# Patient Record
Sex: MALE | Race: WHITE | Employment: UNEMPLOYED | URBAN - METROPOLITAN AREA
[De-identification: names, ages, dates, MRNs, and addresses within clinical notes are randomized per-mention and may not be internally consistent; named-entity substitution may affect disease eponyms.]

---
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
## 2021-11-11 ENCOUNTER — HOSPITAL ENCOUNTER (EMERGENCY)
Age: 21
Discharge: HOME | End: 2021-11-11
Attending: EMERGENCY MEDICINE
Payer: COMMERCIAL

## 2021-11-11 ENCOUNTER — APPOINTMENT (OUTPATIENT)
Dept: INTERVENTIONAL RADIOLOGY/VASCULAR | Age: 21
End: 2021-11-11
Payer: COMMERCIAL

## 2021-11-11 VITALS
TEMPERATURE: 98 F | OXYGEN SATURATION: 99 % | SYSTOLIC BLOOD PRESSURE: 128 MMHG | RESPIRATION RATE: 16 BRPM | DIASTOLIC BLOOD PRESSURE: 58 MMHG | HEART RATE: 65 BPM

## 2021-11-11 DIAGNOSIS — S30.1XXA GROIN HEMATOMA, INITIAL ENCOUNTER: Primary | ICD-10-CM

## 2021-11-11 PROCEDURE — 99281 EMR DPT VST MAYX REQ PHY/QHP: CPT

## 2021-11-11 PROCEDURE — 93926 LOWER EXTREMITY STUDY: CPT

## 2021-11-11 RX ORDER — LISINOPRIL 40 MG/1
40 TABLET ORAL DAILY
COMMUNITY

## 2021-11-11 RX ORDER — METOPROLOL SUCCINATE 25 MG/1
25 TABLET, EXTENDED RELEASE ORAL DAILY
COMMUNITY

## 2021-11-11 RX ORDER — ASPIRIN 81 MG/1
81 TABLET ORAL DAILY
COMMUNITY

## 2021-11-11 ASSESSMENT — PAIN DESCRIPTION - ORIENTATION: ORIENTATION: LEFT

## 2021-11-11 ASSESSMENT — PAIN DESCRIPTION - PAIN TYPE: TYPE: ACUTE PAIN

## 2021-11-11 ASSESSMENT — PAIN SCALES - GENERAL: PAINLEVEL_OUTOF10: 3

## 2021-11-11 ASSESSMENT — PAIN DESCRIPTION - LOCATION: LOCATION: GROIN

## 2021-11-11 NOTE — ED PROVIDER NOTES
catheterization. CURRENT MEDICATIONS       Discharge Medication List as of 11/11/2021  4:03 PM      CONTINUE these medications which have NOT CHANGED    Details   lisinopril (PRINIVIL;ZESTRIL) 40 MG tablet Take 40 mg by mouth dailyHistorical Med      metoprolol succinate (TOPROL XL) 25 MG extended release tablet Take 25 mg by mouth dailyHistorical Med      aspirin 81 MG EC tablet Take 81 mg by mouth dailyHistorical Med             ALLERGIES     has No Known Allergies. FAMILY HISTORY     has no family status information on file. family history is not on file. SOCIAL HISTORY      reports that he has never smoked. He does not have any smokeless tobacco history on file. PHYSICAL EXAM     INITIAL VITALS:  oral temperature is 98 °F (36.7 °C). His blood pressure is 128/58 (abnormal) and his pulse is 65. His respiration is 16 and oxygen saturation is 99%. Physical Exam   Constitutional:  well-developed and well-nourished. HENT: Head: Normocephalic, atraumatic, Bilateral external ears normal, Oropharynx mosit, No oral exudates, Nose normal.   Eyes: PERRL, EOMI, Conjunctiva normal, No discharge. No scleral icterus  Neck: Normal range of motion, No tenderness, Supple  Cardiovascular: Normal rate, regular rhythm, S1 normal and S2 normal.  Exam reveals no gallop. Pulmonary/Chest: Effort normal and breath sounds normal. No accessory muscle usage or stridor. No respiratory distress. no wheezes. has no rales. exhibits no tenderness. Abdominal: Soft. Bowel sounds are normal.  exhibits no distension. There is no tenderness. There is no rebound and no guarding. Extremities: No edema, no tenderness, no cyanosis, no clubbing. Musculoskeletal: Good range of motion in major joints is observed. No major deformities noted. Neurological: Alert and oriented ×3, normal motor function, normal sensory function, no focal deficits. GCS 15. Skin: Skin is warm, dry and intact. No rash noted. No erythema. Psychiatric: Affect normal, judgment normal, mood normal.  DIFFERENTIAL DIAGNOSIS:       DIAGNOSTIC RESULTS     EKG: All EKG's are interpreted by the Emergency Department Physician who either signs or Co-signs this chart in the absence of a cardiologist.      RADIOLOGY: non-plain film images(s) such as CT, Ultrasound and MRI are read by the radiologist.  Plain radiographic images are visualized and preliminarily interpreted by the emergency physician unless otherwise stated below. LABS:   Labs Reviewed - No data to display    EMERGENCY DEPARTMENT COURSE:   Vitals:    Vitals:    11/11/21 1211   BP: (!) 128/58   Pulse: 65   Resp: 16   Temp: 98 °F (36.7 °C)   TempSrc: Oral   SpO2: 99%   Presenting with complaint of hematoma formation/bruising to left groin status post heart cath with left groin used as a entry point. CRITICAL CARE:       CONSULTS:  None    PROCEDURES:  None    FINAL IMPRESSION      1.  Groin hematoma, initial encounter          DISPOSITION/PLAN   Decision To Discharge    PATIENT REFERRED TO:  Heart cath physician in [facility]    Call in 1 day  [address] [facility] [address]:  Discharge Medication List as of 11/11/2021  4:03 PM          (Please note that portions of this note were completed with a voice recognition program.  Efforts were made to edit the dictations but occasionally words are mis-transcribed.)    Lin Berg, DO Lin Berg DO  11/11/21 [id number]       Lin Berg DO  11/15/21 0784

## 2021-11-11 NOTE — ED TRIAGE NOTES
Pt presents to the ED via lobby with c/o left groin hematoma. Pt states he had a cardiac catheterization on 11/5/21 for a valve repair. Pt states he had mild pain and bruising following the procedure.  While at work today pt states the pain worsened

## 2023-01-05 NOTE — TELEPHONE ENCOUNTER
Left message on answering machine requesting pt to call back at earliest convenience. Check to see if patient is going to continue to see Dr Mitch Vargas and if so will need to schedule appt. If appt scheduled, can send message to Dr Clarita Zavala to see if he will fill until appt.

## 2023-02-16 RX ORDER — MONTELUKAST SODIUM 10 MG/1
TABLET ORAL
Qty: 30 TABLET | OUTPATIENT
Start: 2023-02-16

## 2024-10-22 ENCOUNTER — OFFICE VISIT (OUTPATIENT)
Dept: FAMILY MEDICINE CLINIC | Age: 24
End: 2024-10-22
Payer: COMMERCIAL

## 2024-10-22 VITALS
OXYGEN SATURATION: 99 % | DIASTOLIC BLOOD PRESSURE: 78 MMHG | TEMPERATURE: 98.4 F | WEIGHT: 146.8 LBS | HEART RATE: 69 BPM | RESPIRATION RATE: 16 BRPM | SYSTOLIC BLOOD PRESSURE: 124 MMHG

## 2024-10-22 DIAGNOSIS — Q25.21 INTERRUPTED AORTIC ARCH: ICD-10-CM

## 2024-10-22 DIAGNOSIS — F41.9 ANXIETY: ICD-10-CM

## 2024-10-22 DIAGNOSIS — Q25.6 PULMONARY ARTERY STENOSIS: ICD-10-CM

## 2024-10-22 DIAGNOSIS — Z98.890 S/P INTERRUPTED AORTIC ARCH REPAIR: ICD-10-CM

## 2024-10-22 DIAGNOSIS — I10 ESSENTIAL HYPERTENSION: Primary | ICD-10-CM

## 2024-10-22 DIAGNOSIS — Z87.74 H/O NORWOOD PROCEDURE: ICD-10-CM

## 2024-10-22 DIAGNOSIS — I45.10 RBBB: ICD-10-CM

## 2024-10-22 DIAGNOSIS — Q25.42 HYPOPLASTIC AORTIC ARCH: ICD-10-CM

## 2024-10-22 DIAGNOSIS — I37.0 NONRHEUMATIC PULMONARY VALVE STENOSIS: ICD-10-CM

## 2024-10-22 PROCEDURE — 3078F DIAST BP <80 MM HG: CPT | Performed by: STUDENT IN AN ORGANIZED HEALTH CARE EDUCATION/TRAINING PROGRAM

## 2024-10-22 PROCEDURE — 3074F SYST BP LT 130 MM HG: CPT | Performed by: STUDENT IN AN ORGANIZED HEALTH CARE EDUCATION/TRAINING PROGRAM

## 2024-10-22 PROCEDURE — 99214 OFFICE O/P EST MOD 30 MIN: CPT | Performed by: STUDENT IN AN ORGANIZED HEALTH CARE EDUCATION/TRAINING PROGRAM

## 2024-10-22 RX ORDER — BUSPIRONE HYDROCHLORIDE 5 MG/1
5 TABLET ORAL 3 TIMES DAILY
Qty: 90 TABLET | Refills: 0 | Status: SHIPPED | OUTPATIENT
Start: 2024-10-22 | End: 2024-11-21

## 2024-10-22 SDOH — ECONOMIC STABILITY: FOOD INSECURITY: WITHIN THE PAST 12 MONTHS, YOU WORRIED THAT YOUR FOOD WOULD RUN OUT BEFORE YOU GOT MONEY TO BUY MORE.: NEVER TRUE

## 2024-10-22 SDOH — ECONOMIC STABILITY: FOOD INSECURITY: WITHIN THE PAST 12 MONTHS, THE FOOD YOU BOUGHT JUST DIDN'T LAST AND YOU DIDN'T HAVE MONEY TO GET MORE.: NEVER TRUE

## 2024-10-22 SDOH — ECONOMIC STABILITY: INCOME INSECURITY: HOW HARD IS IT FOR YOU TO PAY FOR THE VERY BASICS LIKE FOOD, HOUSING, MEDICAL CARE, AND HEATING?: NOT HARD AT ALL

## 2024-10-22 ASSESSMENT — ENCOUNTER SYMPTOMS
ABDOMINAL PAIN: 0
SHORTNESS OF BREATH: 0
VOMITING: 0
CONSTIPATION: 0
COUGH: 0
RHINORRHEA: 0
NAUSEA: 0
EYE PAIN: 0
DIARRHEA: 0
EYE REDNESS: 0

## 2024-10-22 ASSESSMENT — PATIENT HEALTH QUESTIONNAIRE - PHQ9
SUM OF ALL RESPONSES TO PHQ QUESTIONS 1-9: 0
SUM OF ALL RESPONSES TO PHQ QUESTIONS 1-9: 0
2. FEELING DOWN, DEPRESSED OR HOPELESS: NOT AT ALL
SUM OF ALL RESPONSES TO PHQ9 QUESTIONS 1 & 2: 0
1. LITTLE INTEREST OR PLEASURE IN DOING THINGS: NOT AT ALL
SUM OF ALL RESPONSES TO PHQ QUESTIONS 1-9: 0
SUM OF ALL RESPONSES TO PHQ QUESTIONS 1-9: 0

## 2024-10-22 ASSESSMENT — ANXIETY QUESTIONNAIRES
GAD7 TOTAL SCORE: 3
IF YOU CHECKED OFF ANY PROBLEMS ON THIS QUESTIONNAIRE, HOW DIFFICULT HAVE THESE PROBLEMS MADE IT FOR YOU TO DO YOUR WORK, TAKE CARE OF THINGS AT HOME, OR GET ALONG WITH OTHER PEOPLE: SOMEWHAT DIFFICULT
1. FEELING NERVOUS, ANXIOUS, OR ON EDGE: SEVERAL DAYS
2. NOT BEING ABLE TO STOP OR CONTROL WORRYING: SEVERAL DAYS
5. BEING SO RESTLESS THAT IT IS HARD TO SIT STILL: NOT AT ALL
7. FEELING AFRAID AS IF SOMETHING AWFUL MIGHT HAPPEN: NOT AT ALL
4. TROUBLE RELAXING: NOT AT ALL
3. WORRYING TOO MUCH ABOUT DIFFERENT THINGS: SEVERAL DAYS
6. BECOMING EASILY ANNOYED OR IRRITABLE: NOT AT ALL

## 2024-10-22 NOTE — PROGRESS NOTES
Sunny Lynn (:  2000) is a 24 y.o. male,New patient, here for evaluation of the following chief complaint(s):  New Patient (New patient), Anxiety (Anxiety x would like to see about doing something as needed medication wise /), Health Maintenance (See note /), and Heart Problem (Has congential heart disease and has been struggling /)      Assessment & Plan   ASSESSMENT/PLAN:  1. Essential hypertension  2. Hypoplastic aortic arch  3. Interrupted aortic arch  4. Pulmonary artery stenosis  5. Nonrheumatic pulmonary valve stenosis  6. RBBB  7. S/P interrupted aortic arch repair  8. H/O [place] procedure  9. Anxiety  -     busPIRone (BUSPAR) 5 MG tablet; Take 1 tablet by mouth 3 times daily, Disp-90 tablet, R-0Normal  24 old male presents the office to establish as a new patient.  Patient is a pertinent past medical history of hypertension, hypoplastic aortic arch, interrupted aortic arch, pulmonary artery stenosis, nonrheumatic pulmonary valve stenosis, right bundle branch block, status post interrupted aortic arch repair, history of [place] procedure since infancy.  Patient overall stable at this time she continue to [facility].  Seems to be stable with vital stable on exam.    Anxiety: Chronic, uncontrolled.  Will plan to initiate BuSpar 5 mg 3 times daily as needed and follow-up for potential medication adjustment.  Patient is agreeable    Recent lab work reviewed    Return in about 3 months (around 2025) for medication recheck.         Subjective   SUBJECTIVE/OBJECTIVE:  24-year-old male presents the office to establish as new patient.  Has a pertinent past medical history of multiple cardiovascular conditions from congenital heart defects and multiple surgeries.  Also has new concern of anxiety    Multiple chronic medical conditions reviewed today: As stated above    Patient states he has been dealing with congenital heart defects with multiple surgeries

## 2024-11-07 ENCOUNTER — OFFICE VISIT (OUTPATIENT)
Dept: FAMILY MEDICINE CLINIC | Age: 24
End: 2024-11-07
Payer: COMMERCIAL

## 2024-11-07 VITALS
OXYGEN SATURATION: 98 % | DIASTOLIC BLOOD PRESSURE: 62 MMHG | TEMPERATURE: 98.1 F | RESPIRATION RATE: 16 BRPM | WEIGHT: 150.6 LBS | SYSTOLIC BLOOD PRESSURE: 129 MMHG | HEART RATE: 54 BPM

## 2024-11-07 DIAGNOSIS — R07.89 OTHER CHEST PAIN: Primary | ICD-10-CM

## 2024-11-07 PROCEDURE — 3074F SYST BP LT 130 MM HG: CPT | Performed by: STUDENT IN AN ORGANIZED HEALTH CARE EDUCATION/TRAINING PROGRAM

## 2024-11-07 PROCEDURE — 3078F DIAST BP <80 MM HG: CPT | Performed by: STUDENT IN AN ORGANIZED HEALTH CARE EDUCATION/TRAINING PROGRAM

## 2024-11-07 PROCEDURE — 99214 OFFICE O/P EST MOD 30 MIN: CPT | Performed by: STUDENT IN AN ORGANIZED HEALTH CARE EDUCATION/TRAINING PROGRAM

## 2024-11-07 RX ORDER — CYCLOBENZAPRINE HCL 5 MG
5 TABLET ORAL 3 TIMES DAILY PRN
Qty: 30 TABLET | Refills: 0 | Status: SHIPPED | OUTPATIENT
Start: 2024-11-07 | End: 2024-11-17

## 2024-11-07 RX ORDER — IBUPROFEN 600 MG/1
600 TABLET, FILM COATED ORAL 3 TIMES DAILY PRN
Qty: 30 TABLET | Refills: 0 | Status: SHIPPED | OUTPATIENT
Start: 2024-11-07 | End: 2024-11-17

## 2024-11-07 ASSESSMENT — ENCOUNTER SYMPTOMS
DIARRHEA: 0
CONSTIPATION: 0
COUGH: 0
ABDOMINAL PAIN: 0
VOMITING: 0
SHORTNESS OF BREATH: 0
NAUSEA: 0

## 2024-11-07 NOTE — PROGRESS NOTES
Health Maintenance Due   Topic Date Due    DTaP/Tdap/Td vaccine (6 - Tdap) 04/20/2012    HPV vaccine (1 - Male 3-dose series) Never done    HIV screen  Never done    Hepatitis C screen  Never done    Flu vaccine (1) Never done    COVID-19 Vaccine (1 - 2023-24 season) Never done

## 2024-11-07 NOTE — PROGRESS NOTES
Sunny Lynn (:  2000) is a 24 y.o. male,Established patient, here for evaluation of the following chief complaint(s):  Chest Pain (Chest discomfort - ongoing ) and Health Maintenance (See note /)      Assessment & Plan   ASSESSMENT/PLAN:  1. Other chest pain  -     cyclobenzaprine (FLEXERIL) 5 MG tablet; Take 1 tablet by mouth 3 times daily as needed for Muscle spasms, Disp-30 tablet, R-0Normal  -     ibuprofen (ADVIL;MOTRIN) 600 MG tablet; Take 1 tablet by mouth 3 times daily as needed for Pain, Disp-30 tablet, R-0Normal  24-year-old male presents the office for concerns of chest pain.  Etiology unclear at this time.  Patient did just have a cardiac evaluation that was negative including blood work which was negative, echo, EKG within normal limits.  Patient also trialed PPI therapy which has not improved symptoms.  Concern for possible underlying pleuritic chest pain versus musculoskeletal versus other.  Will plan treat with anti-inflammatories, muscle relaxer and follow-up if symptoms not improved.  Patient verbalized understanding.    Return if symptoms worsen or fail to improve.         Subjective   SUBJECTIVE/OBJECTIVE:  24-year-old male presents the office for concerns of chest pain.  Patient has a pretty extensive past medical history of multiple cardiovascular/congenital heart defects status post cardiac surgeries.  Currently follows up with cardiology just recently had a echocardiogram 2 weeks ago with chest x-ray and EKG which was all benign.  Patient also had lab work that was reviewed and benign.  Patient has been on PPI therapy for the past week or 2 which has not helped with chest pain.  Patient continues have this daily chronic intermittent chest pain that seems to radiate across both sides of his chest sometimes into his back denies any shortness of breath.  Symptoms have some discomfort when he leans forwards.  Is not really affecting his everyday function but does feel it constantly

## 2024-11-19 DIAGNOSIS — F41.9 ANXIETY: ICD-10-CM

## 2024-11-19 RX ORDER — BUSPIRONE HYDROCHLORIDE 5 MG/1
5 TABLET ORAL 3 TIMES DAILY
Qty: 90 TABLET | Refills: 0 | Status: SHIPPED | OUTPATIENT
Start: 2024-11-19

## 2024-12-18 LAB
B-TYPE NATRIURETIC PEPTIDE: 23 PG/ML (ref 0–100)
BASOPHILS ABSOLUTE: 0.08 10*3/UL (ref 0–0.2)
BASOPHILS RELATIVE PERCENT: 1.3 % (ref 0–1)
CK MB: 3.4 NG/ML (ref 0–5)
CREATININE KINASE MB/CREATININE KINASE TOTAL: 5.1 (ref 0–1.9)
EOSINOPHILS ABSOLUTE: 1.04 10*3/UL (ref 0–0.5)
EOSINOPHILS RELATIVE PERCENT: 16.3 % (ref 0–6)
HCT VFR BLD CALC: 42.5 % (ref 39–55)
HEMOGLOBIN: 14.4 G/DL (ref 13–17)
IMMATURE GRANULOCYTES %: 0.3 % (ref 0–1)
IMMATURE GRANULOCYTES ABSOLUTE: 0.02 10*3/UL (ref 0–0.2)
LYMPHOCYTES ABSOLUTE: 1.43 10*3/UL (ref 1.2–4)
LYMPHOCYTES RELATIVE PERCENT: 22.3 % (ref 20–45)
MCH RBC QN AUTO: 29.4 PG (ref 27–33)
MCHC RBC AUTO-ENTMCNC: 33.9 G/DL (ref 32–35)
MCV RBC AUTO: 86.7 FL (ref 82–98)
MONOCYTES ABSOLUTE: 0.54 10*3/UL (ref 0.1–1)
MONOCYTES RELATIVE PERCENT: 8.4 % (ref 5–12)
NEUTROPHILS ABSOLUTE: 3.29 10*3/UL (ref 1.6–7.6)
NEUTROPHILS RELATIVE PERCENT: 51.4 % (ref 40–72)
NRBC AUTOMATED: 0 %
PDW BLD-RTO: 12.4 % (ref 11.5–15)
PLATELET # BLD: 248 10*3/UL (ref 150–400)
RBC # BLD: 4.9 10*6/UL (ref 4.2–5.7)
SED RATE, AUTOMATED: 2 MM/HR
TOTAL CK: 67 U/L (ref 30–223)
TROPONIN I: 0 NG/ML (ref 0–0.04)
WBC # BLD: 6.4 10*3/UL (ref 4–10.6)

## 2024-12-23 LAB — C-REACTIVE PROTEIN: <5.4 MG/L

## 2025-01-29 ENCOUNTER — OFFICE VISIT (OUTPATIENT)
Dept: FAMILY MEDICINE CLINIC | Age: 25
End: 2025-01-29
Payer: COMMERCIAL

## 2025-01-29 DIAGNOSIS — R07.89 OTHER CHEST PAIN: Primary | ICD-10-CM

## 2025-01-29 PROCEDURE — 3078F DIAST BP <80 MM HG: CPT | Performed by: STUDENT IN AN ORGANIZED HEALTH CARE EDUCATION/TRAINING PROGRAM

## 2025-01-29 PROCEDURE — 99213 OFFICE O/P EST LOW 20 MIN: CPT | Performed by: STUDENT IN AN ORGANIZED HEALTH CARE EDUCATION/TRAINING PROGRAM

## 2025-01-29 PROCEDURE — 3074F SYST BP LT 130 MM HG: CPT | Performed by: STUDENT IN AN ORGANIZED HEALTH CARE EDUCATION/TRAINING PROGRAM

## 2025-01-29 RX ORDER — OMEPRAZOLE 40 MG/1
40 CAPSULE, DELAYED RELEASE ORAL 2 TIMES DAILY
Qty: 60 CAPSULE | Refills: 0 | Status: SHIPPED | OUTPATIENT
Start: 2025-01-29 | End: 2025-02-28

## 2025-01-29 RX ORDER — LOSARTAN POTASSIUM 25 MG/1
TABLET ORAL 2 TIMES DAILY
COMMUNITY
Start: 2025-01-09 | End: 2026-01-09

## 2025-01-29 SDOH — ECONOMIC STABILITY: FOOD INSECURITY: WITHIN THE PAST 12 MONTHS, THE FOOD YOU BOUGHT JUST DIDN'T LAST AND YOU DIDN'T HAVE MONEY TO GET MORE.: NEVER TRUE

## 2025-01-29 SDOH — ECONOMIC STABILITY: FOOD INSECURITY: WITHIN THE PAST 12 MONTHS, YOU WORRIED THAT YOUR FOOD WOULD RUN OUT BEFORE YOU GOT MONEY TO BUY MORE.: NEVER TRUE

## 2025-01-29 ASSESSMENT — PATIENT HEALTH QUESTIONNAIRE - PHQ9
SUM OF ALL RESPONSES TO PHQ QUESTIONS 1-9: 0
SUM OF ALL RESPONSES TO PHQ QUESTIONS 1-9: 0
2. FEELING DOWN, DEPRESSED OR HOPELESS: NOT AT ALL
1. LITTLE INTEREST OR PLEASURE IN DOING THINGS: NOT AT ALL
SUM OF ALL RESPONSES TO PHQ QUESTIONS 1-9: 0
SUM OF ALL RESPONSES TO PHQ9 QUESTIONS 1 & 2: 0
SUM OF ALL RESPONSES TO PHQ QUESTIONS 1-9: 0

## 2025-01-29 ASSESSMENT — ENCOUNTER SYMPTOMS
ABDOMINAL PAIN: 1
VOMITING: 0
NAUSEA: 0
SHORTNESS OF BREATH: 0
COUGH: 0
DIARRHEA: 0
CONSTIPATION: 0

## 2025-01-29 NOTE — PROGRESS NOTES
Sunny Lynn (:  2000) is a 24 y.o. male,Established patient, here for evaluation of the following chief complaint(s):  Other (Has been having CP, cardio ruled out heart, mid-sternal pain consistently, )      Assessment & Plan   ASSESSMENT/PLAN:  1. Other chest pain  -     omeprazole (PRILOSEC) 40 MG delayed release capsule; Take 1 capsule by mouth in the morning and at bedtime, Disp-60 capsule, R-0Normal  24-year-old male presents the office for chest pain.  Patient continues to have chest pain that is a constant dull aching discomfort from his epigastric area up into his midsternal area.  Is currently following up with cardiology has had multiple evaluations completed including imaging, echocardiogram EKGs, laboratory evaluation which seems to be stable.  Patient does have a interval changes in eosinophil count from 3% up to 16%.  Was recently switched from lisinopril to losartan due to concern for possible allergy induced symptoms although does not seem to make much of a difference.  Etiology unclear at this time, concerns for possible underlying gastritis symptoms causing radiation discomfort.  Will plan to initiate high-dose omeprazole at 40 mg twice a day for 30 days and see if there is any net benefit.  Could consider EGD in the future.  Could also consider stress testing through cardiology.  Will follow-up to see what the response is.        Return if symptoms worsen or fail to improve.         Subjective   SUBJECTIVE/OBJECTIVE:  24-year-old male presents the office for chest pain.  Patient states that he was seen previously here for chest pain.  He has been having this chronic dull aching pretty consistent chest pain from his epigastrium to midsternal area.  He saw his cardiologist in mid December who did blood work and a workup that did not show a lot of than some elevated eosinophil count.  There is concern for possible allergy induced eosinophil count resulting in some discomfort with switch

## 2025-01-30 VITALS
HEIGHT: 69 IN | SYSTOLIC BLOOD PRESSURE: 126 MMHG | OXYGEN SATURATION: 100 % | WEIGHT: 166 LBS | RESPIRATION RATE: 16 BRPM | BODY MASS INDEX: 24.59 KG/M2 | HEART RATE: 56 BPM | DIASTOLIC BLOOD PRESSURE: 56 MMHG | TEMPERATURE: 97.7 F

## 2025-03-05 LAB
BASOPHILS ABSOLUTE: 100 /CMM (ref 0–200)
BASOPHILS RELATIVE PERCENT: 0.9 % (ref 0–2)
EOSINOPHILS ABSOLUTE: 700 /CMM (ref 0–500)
EOSINOPHILS RELATIVE PERCENT: 10.7 % (ref 0–6)
HCT VFR BLD CALC: 39.1 % (ref 40–49)
HEMOGLOBIN: 13.5 GM/DL (ref 13.5–16.5)
LYMPHOCYTES ABSOLUTE: 1600 /CMM (ref 1000–4800)
LYMPHOCYTES RELATIVE PERCENT: 24.7 % (ref 15–45)
MCH RBC QN AUTO: 30.2 PG (ref 27.5–33)
MCHC RBC AUTO-ENTMCNC: 34.6 GM/DL (ref 33–36)
MCV RBC AUTO: 87.3 CU MIC (ref 80–97)
MONOCYTES ABSOLUTE: 600 /CMM (ref 0–800)
MONOCYTES RELATIVE PERCENT: 9.3 % (ref 2–10)
NEUTROPHILS ABSOLUTE: 3500 /CMM (ref 1800–7700)
NEUTROPHILS RELATIVE PERCENT: 54.4 % (ref 40–70)
NUCLEATED RBCS: 0.1 /100 WBC
PDW BLD-RTO: 12.4 % (ref 12–16)
PLATELET # BLD: 252 TH/CMM (ref 150–400)
RBC # BLD: 4.48 MIL/CMM (ref 4.5–6)
WBC # BLD: 6.4 TH/CMM (ref 4.4–10.5)

## 2025-03-07 ENCOUNTER — PATIENT MESSAGE (OUTPATIENT)
Dept: FAMILY MEDICINE CLINIC | Age: 25
End: 2025-03-07

## 2025-03-07 DIAGNOSIS — K20.0 EOSINOPHILIC ESOPHAGITIS: Primary | ICD-10-CM

## 2025-03-07 DIAGNOSIS — R07.89 OTHER CHEST PAIN: ICD-10-CM

## 2025-03-07 RX ORDER — OMEPRAZOLE 40 MG/1
40 CAPSULE, DELAYED RELEASE ORAL 2 TIMES DAILY
Qty: 180 CAPSULE | Refills: 0 | Status: SHIPPED | OUTPATIENT
Start: 2025-03-07 | End: 2025-06-05

## 2025-05-05 ENCOUNTER — ANESTHESIA EVENT (OUTPATIENT)
Dept: ENDOSCOPY | Age: 25
End: 2025-05-05
Payer: COMMERCIAL

## 2025-05-05 RX ORDER — SODIUM CHLORIDE 9 MG/ML
INJECTION, SOLUTION INTRAVENOUS CONTINUOUS
Status: DISCONTINUED | OUTPATIENT
Start: 2025-05-05 | End: 2025-05-06 | Stop reason: HOSPADM

## 2025-05-05 RX ORDER — SODIUM CHLORIDE 0.9 % (FLUSH) 0.9 %
5-40 SYRINGE (ML) INJECTION PRN
Status: DISCONTINUED | OUTPATIENT
Start: 2025-05-05 | End: 2025-05-06 | Stop reason: HOSPADM

## 2025-05-05 RX ORDER — SODIUM CHLORIDE 0.9 % (FLUSH) 0.9 %
5-40 SYRINGE (ML) INJECTION EVERY 12 HOURS SCHEDULED
Status: DISCONTINUED | OUTPATIENT
Start: 2025-05-05 | End: 2025-05-06 | Stop reason: HOSPADM

## 2025-05-05 RX ORDER — SODIUM CHLORIDE 9 MG/ML
25 INJECTION, SOLUTION INTRAVENOUS PRN
Status: DISCONTINUED | OUTPATIENT
Start: 2025-05-05 | End: 2025-05-06 | Stop reason: HOSPADM

## 2025-05-06 ENCOUNTER — APPOINTMENT (OUTPATIENT)
Dept: ENDOSCOPY | Age: 25
End: 2025-05-06
Attending: INTERNAL MEDICINE
Payer: COMMERCIAL

## 2025-05-06 ENCOUNTER — ANESTHESIA (OUTPATIENT)
Dept: ENDOSCOPY | Age: 25
End: 2025-05-06
Payer: COMMERCIAL

## 2025-05-06 ENCOUNTER — HOSPITAL ENCOUNTER (OUTPATIENT)
Age: 25
Setting detail: OUTPATIENT SURGERY
Discharge: HOME | End: 2025-05-06
Attending: INTERNAL MEDICINE | Admitting: INTERNAL MEDICINE
Payer: COMMERCIAL

## 2025-05-06 VITALS
WEIGHT: 163.8 LBS | TEMPERATURE: 97.3 F | SYSTOLIC BLOOD PRESSURE: 120 MMHG | BODY MASS INDEX: 22.93 KG/M2 | RESPIRATION RATE: 14 BRPM | HEART RATE: 55 BPM | HEIGHT: 71 IN | DIASTOLIC BLOOD PRESSURE: 58 MMHG | OXYGEN SATURATION: 100 %

## 2025-05-06 PROCEDURE — 3700000001 HC ADD 15 MINUTES (ANESTHESIA): Performed by: INTERNAL MEDICINE

## 2025-05-06 PROCEDURE — 7100000010 HC PHASE II RECOVERY - FIRST 15 MIN: Performed by: INTERNAL MEDICINE

## 2025-05-06 PROCEDURE — 88305 TISSUE EXAM BY PATHOLOGIST: CPT

## 2025-05-06 PROCEDURE — 3609017100 HC EGD: Performed by: INTERNAL MEDICINE

## 2025-05-06 PROCEDURE — 2580000003 HC RX 258: Performed by: INTERNAL MEDICINE

## 2025-05-06 PROCEDURE — 3609012400 HC EGD TRANSORAL BIOPSY SINGLE/MULTIPLE: Performed by: INTERNAL MEDICINE

## 2025-05-06 PROCEDURE — 6360000002 HC RX W HCPCS: Performed by: NURSE ANESTHETIST, CERTIFIED REGISTERED

## 2025-05-06 PROCEDURE — 7100000011 HC PHASE II RECOVERY - ADDTL 15 MIN: Performed by: INTERNAL MEDICINE

## 2025-05-06 PROCEDURE — 3700000000 HC ANESTHESIA ATTENDED CARE: Performed by: INTERNAL MEDICINE

## 2025-05-06 RX ORDER — LIDOCAINE HYDROCHLORIDE 20 MG/ML
INJECTION, SOLUTION INFILTRATION; PERINEURAL
Status: DISCONTINUED | OUTPATIENT
Start: 2025-05-06 | End: 2025-05-06 | Stop reason: SDUPTHER

## 2025-05-06 RX ORDER — PROPOFOL 10 MG/ML
INJECTION, EMULSION INTRAVENOUS
Status: DISCONTINUED | OUTPATIENT
Start: 2025-05-06 | End: 2025-05-06 | Stop reason: SDUPTHER

## 2025-05-06 RX ADMIN — PROPOFOL 50 MG: 10 INJECTION, EMULSION INTRAVENOUS at 15:06

## 2025-05-06 RX ADMIN — PROPOFOL 50 MG: 10 INJECTION, EMULSION INTRAVENOUS at 15:10

## 2025-05-06 RX ADMIN — LIDOCAINE HYDROCHLORIDE 100 MG: 20 INJECTION, SOLUTION INFILTRATION; PERINEURAL at 15:04

## 2025-05-06 RX ADMIN — PROPOFOL 70 MG: 10 INJECTION, EMULSION INTRAVENOUS at 15:08

## 2025-05-06 RX ADMIN — PROPOFOL 30 MG: 10 INJECTION, EMULSION INTRAVENOUS at 15:12

## 2025-05-06 RX ADMIN — SODIUM CHLORIDE: 0.9 INJECTION, SOLUTION INTRAVENOUS at 13:39

## 2025-05-06 RX ADMIN — PROPOFOL 100 MG: 10 INJECTION, EMULSION INTRAVENOUS at 15:04

## 2025-05-06 ASSESSMENT — PAIN - FUNCTIONAL ASSESSMENT
PAIN_FUNCTIONAL_ASSESSMENT: NONE - DENIES PAIN

## 2025-05-06 NOTE — POST SEDATION
[facility]  Sedation/Analgesia Post Sedation Record    Patient: Sunny Lynn : 2000  Med Rec#: [id number] Acc#: [id number]   Procedure Performed By: Tyrone Mccabe MD  Primary Care Physician: Akin Pickard DO    POST-PROCEDURE    Physicians/Assistants: Tyrone Mccabe MD  Procedure Performed:    Sedation/Anesthesia:     Estimated Blood Loss:          ml  Specimens Removed:  []None [x]Other:      Disposition of Specimen:  [x]Pathology []Other      Complications:   [x]None Immediate []Other:     Post-procedure Diagnosis/Findings:             Recommendations:           eoe    Tyrone Mccabe MD, MD FACG  Electronically signed 2025 at 3:17 PM

## 2025-05-06 NOTE — PROGRESS NOTES
EGD complete, photos taken, 1 specimen jar taken by biopsy forceps, pt tolerated procedure well    Scope Number  used.     
Pt admitted to Endo department and admitted to Endo room 12  Plan of care reviewed with patient.   Call light within reach.   Bed in lowest position, locked, with one bed rail up.   Appropriate arm bands on patient.   Bathroom offered.   All questions and concerns of patient addressed.  Allergies confirmed with patient.    Name: Katheryn  Relationship to patient: Mom  Phone number: [phone]    
Recovery mode, pt denies discomfort. Passing gas, taking in fluids. Dr. Mccabe discussed findings with pt and responsible party. Discharge instructions provided and understanding verbalized.    
(M6) moves spontaneously and purposely

## 2025-05-06 NOTE — ANESTHESIA PRE PROCEDURE
Department of Anesthesiology  Preprocedure Note       Name:  Sunny Lynn   Age:  25 y.o.  :  2000                                          MRN:  [id number]         Date:  2025      Surgeon: Surgeon(s):  Tyrone Mccabe MD    Procedure: Procedure(s):  ESOPHAGOGASTRODUODENOSCOPY    Medications prior to admission:   Prior to Admission medications    Medication Sig Start Date End Date Taking? Authorizing Provider   omeprazole (PRILOSEC) 40 MG delayed release capsule Take 1 capsule by mouth in the morning and at bedtime 3/7/25 6/5/25 Yes Akin Pickard DO   losartan (COZAAR) 25 MG tablet Take by mouth 2 times daily 25 Yes ProviderShai MD   metoprolol succinate (TOPROL XL) 25 MG extended release tablet Take 1 tablet by mouth daily   Yes Provider, MD Shai   ibuprofen (ADVIL;MOTRIN) 600 MG tablet Take 1 tablet by mouth 3 times daily as needed for Pain 24  Akin Pickard DO       Current medications:    Current Facility-Administered Medications   Medication Dose Route Frequency Provider Last Rate Last Admin    sodium chloride flush 0.9 % injection 5-40 mL  5-40 mL IntraVENous 2 times per day Tyrone Mccabe MD        sodium chloride flush 0.9 % injection 5-40 mL  5-40 mL IntraVENous PRN Tyrone Mccabe MD        0.9 % sodium chloride infusion  25 mL IntraVENous PRN Tyrone Mccabe MD        0.9 % sodium chloride infusion   IntraVENous Continuous Tyrone Mccabe MD 75 mL/hr at 25 1339 New Bag at 25 1339       Allergies:  No Known Allergies    Problem List:    Patient Active Problem List   Diagnosis Code    Interrupted aortic arch Q25.21    Hypoplastic aortic arch Q25.42    S/P interrupted aortic arch repair Z98.890    Pulmonary stenosis, valvar I37.0    H/O [place] procedure Z87.74    RBBB I45.10    Pulmonary artery stenosis Q25.6    Essential hypertension I10    Anxiety F41.9       Past Medical History:        Diagnosis Date    Hypoplastic aortic arch

## 2025-05-06 NOTE — ANESTHESIA POSTPROCEDURE EVALUATION
Department of Anesthesiology  Postprocedure Note    Patient: Sunny Lynn  MRN: [id number]  YOB: 2000  Date of evaluation: 5/6/2025    Procedure Summary       Date: 05/06/25 Room / Location: [place] [address] / [facility]    Anesthesia Start: 1500 Anesthesia Stop: 1521    Procedures:       ESOPHAGOGASTRODUODENOSCOPY      ESOPHAGOGASTRODUODENOSCOPY BIOPSY (Left: Esophagus) Diagnosis:       Atypical chest pain      (Atypical chest pain [R07.89])    Surgeons: Tyrone Mccabe MD Responsible Provider:     Anesthesia Type: MAC ASA Status: 3            Anesthesia Type: No value filed.    Norberto Phase I: Norberto Score: 10    Norberto Phase II: Norberto Score: 10    Anesthesia Post Evaluation    Patient location during evaluation: bedside  Patient participation: complete - patient participated  Level of consciousness: awake and alert  Pain score: 0  Airway patency: patent  Nausea & Vomiting: no vomiting and no nausea  Cardiovascular status: blood pressure returned to baseline and hemodynamically stable  Respiratory status: spontaneous ventilation and room air  Hydration status: stable  Pain management: satisfactory to patient        No notable events documented.

## 2025-05-06 NOTE — H&P
(ADVIL;MOTRIN) 600 MG tablet Take 1 tablet by mouth 3 times daily as needed for Pain 11/7/24 11/17/24  Akin Pickard, DO     Additional information:       Ros   10 system negative      PHYSICAL:   Heart:  [x]Regular rate and rhythm  []Other:    Lungs:  [x]Clear    []Other:    Abdomen: [x]Soft    []Other:    Mental Status: [x]Alert & Oriented  []Other:      VITAL SIGNS   Patient Vitals for the past 24 hrs:   BP Temp Temp src Pulse Resp SpO2 Height Weight   05/06/25 1325 (!) 133/56 97.9 °F (36.6 °C) Temporal 54 17 100 % 1.803 m (5' 11\") 74.3 kg (163 lb 12.8 oz)       PLANNED PROCEDURE  []EGD  []Colonoscopy []Flex Sigmoid  []ERCP []EUS   []Cystoscopy  [] CATH [] BRONCH   Consent: I have discussed with the patient and/or the patient representative the indication, alternatives, and the possible risks and/or complications of the planned procedure and the anesthesia methods. The patient and/or patient representative appear to understand and agree to proceed.  SEDATION  Planned agent:[]Midazolam []Meperidine []Sublimaze []Morphine  []Diazepam [x]Propofol  []Other:     ASA Classification: Class 3 - A patient with severe systemic disease that limits activity but is not incapacitating    Airway Assessment: normal    Monitoring and Safety: The patient will be placed on a cardiac monitor and vital signs, pulse oximetry and level of consciousness will be continuously evaluated throughout the procedure. The patient will be closely monitored until recovery from the medications is complete and the patient has returned to baseline status.  Respiratory therapy will be on standby during the procedure.    [x]Pre-procedure diagnostic studies complete and results available.   Comment:    [x]Previous sedation/anesthesia experiences assessed.   Comment:    [x]The patient is an appropriate candidate to undergo the planned procedure sedation and anesthesia. (Refer to nursing sedation/analgesia documentation record)  [x]Formulation and

## 2025-05-07 NOTE — PROCEDURES
[place] [id number]                             PROCEDURE NOTE      PATIENT NAME: ANTONINO BETH                : 2000  MED REC NO: [id number]                       ROOM: [facility]  ACCOUNT NO: [id number]                       ADMIT DATE: 2025  PROVIDER: Tyrone Mccabe MD      DATE OF PROCEDURE:  2025    SURGEON:  Tyrone Mccabe MD    PROCEDURE:  Esophagogastroduodenoscopy plus biopsy.    INDICATIONS FOR PROCEDURE:  The patient with a history of chest discomfort and high eosinophil count.  See the recent office note and preop note for rest of clinicals.    ASA CLASSIFICATION:  III.    MEDICATIONS:  Per Anesthesia.    BIOPSY:  Yes.    PHOTOGRAPHS:  Yes.    DESCRIPTION OF PROCEDURE:  Informed consent was obtained after explaining risks and benefits of the procedure and conscious sedation.  Possible complications including bleeding, perforation, reaction to medicine, but not limiting to death, were discussed.    Afterwards, GIF-180 gastroscope was advanced through oropharynx, esophagus, stomach into the duodenum.  Normal-looking duodenal bulb and second portion.  Scope was withdrawn.  The patient having normal antrum.  Retroflexed exam showed normal angularis, body of the stomach, fundus, and cardia.  Scope was withdrawn.  The patient having changes of eosinophilic esophagitis with the rings as well as the vertical burrows.  Biopsies were taken in the upper esophagus and lower esophagus to confirm.  There was no need to dilate.  Tolerated the procedure well.    IMPRESSION:  Endoscopic suspicion for eosinophilic esophagitis.    RECOMMENDATIONS:  I am putting the patient on b.i.d. dosing and we will bring the patient back in the office in few months.  We will probably try to educate the patient and we should cut down on gluten in the diet.          TYRONE MCCABE MD      D:  2025 15:20:30     T:  2025